# Patient Record
Sex: MALE | Race: WHITE | NOT HISPANIC OR LATINO
[De-identification: names, ages, dates, MRNs, and addresses within clinical notes are randomized per-mention and may not be internally consistent; named-entity substitution may affect disease eponyms.]

---

## 2017-06-15 ENCOUNTER — APPOINTMENT (OUTPATIENT)
Dept: OPHTHALMOLOGY | Facility: CLINIC | Age: 82
End: 2017-06-15

## 2017-12-13 ENCOUNTER — APPOINTMENT (OUTPATIENT)
Dept: OPHTHALMOLOGY | Facility: CLINIC | Age: 82
End: 2017-12-13
Payer: MEDICARE

## 2017-12-13 PROCEDURE — 92014 COMPRE OPH EXAM EST PT 1/>: CPT

## 2018-01-17 ENCOUNTER — APPOINTMENT (OUTPATIENT)
Dept: OPHTHALMOLOGY | Facility: CLINIC | Age: 83
End: 2018-01-17
Payer: MEDICARE

## 2018-01-17 PROCEDURE — 66821 AFTER CATARACT LASER SURGERY: CPT | Mod: LT

## 2018-01-31 ENCOUNTER — APPOINTMENT (OUTPATIENT)
Dept: OPHTHALMOLOGY | Facility: CLINIC | Age: 83
End: 2018-01-31
Payer: MEDICARE

## 2018-01-31 DIAGNOSIS — H26.492 OTHER SECONDARY CATARACT, LEFT EYE: ICD-10-CM

## 2018-01-31 PROCEDURE — 99024 POSTOP FOLLOW-UP VISIT: CPT

## 2018-03-28 ENCOUNTER — APPOINTMENT (OUTPATIENT)
Dept: OPHTHALMOLOGY | Facility: CLINIC | Age: 83
End: 2018-03-28
Payer: MEDICARE

## 2018-03-28 PROCEDURE — 92012 INTRM OPH EXAM EST PATIENT: CPT | Mod: 24

## 2018-09-26 ENCOUNTER — APPOINTMENT (OUTPATIENT)
Dept: OPHTHALMOLOGY | Facility: CLINIC | Age: 83
End: 2018-09-26
Payer: MEDICARE

## 2018-09-26 PROCEDURE — 92012 INTRM OPH EXAM EST PATIENT: CPT

## 2019-03-27 ENCOUNTER — APPOINTMENT (OUTPATIENT)
Dept: OPHTHALMOLOGY | Facility: CLINIC | Age: 84
End: 2019-03-27
Payer: MEDICARE

## 2019-03-27 DIAGNOSIS — H02.88A MEIBOMIAN GLAND DYSFUNCTION RIGHT EYE, UPPER AND LOWER EYELIDS: ICD-10-CM

## 2019-03-27 DIAGNOSIS — Z96.1 PRESENCE OF INTRAOCULAR LENS: ICD-10-CM

## 2019-03-27 DIAGNOSIS — H35.30 UNSPECIFIED MACULAR DEGENERATION: ICD-10-CM

## 2019-03-27 DIAGNOSIS — H02.88B MEIBOMIAN GLAND DYSFUNCTION RIGHT EYE, UPPER AND LOWER EYELIDS: ICD-10-CM

## 2019-03-27 DIAGNOSIS — H43.813 VITREOUS DEGENERATION, BILATERAL: ICD-10-CM

## 2019-03-27 PROCEDURE — 92012 INTRM OPH EXAM EST PATIENT: CPT

## 2019-03-27 RX ORDER — PREDNISOLONE ACETATE 10 MG/ML
1 SUSPENSION/ DROPS OPHTHALMIC
Qty: 1 | Refills: 0 | Status: DISCONTINUED | COMMUNITY
Start: 2018-01-17 | End: 2019-03-27

## 2019-09-25 ENCOUNTER — APPOINTMENT (OUTPATIENT)
Dept: OPHTHALMOLOGY | Facility: CLINIC | Age: 84
End: 2019-09-25
Payer: MEDICARE

## 2019-09-25 ENCOUNTER — NON-APPOINTMENT (OUTPATIENT)
Age: 84
End: 2019-09-25

## 2019-09-25 PROCEDURE — 92014 COMPRE OPH EXAM EST PT 1/>: CPT

## 2019-09-25 PROCEDURE — 92134 CPTRZ OPH DX IMG PST SGM RTA: CPT

## 2020-01-23 ENCOUNTER — APPOINTMENT (OUTPATIENT)
Dept: OPHTHALMOLOGY | Facility: CLINIC | Age: 85
End: 2020-01-23
Payer: MEDICARE

## 2020-01-23 ENCOUNTER — NON-APPOINTMENT (OUTPATIENT)
Age: 85
End: 2020-01-23

## 2020-01-23 PROCEDURE — 92014 COMPRE OPH EXAM EST PT 1/>: CPT

## 2020-01-23 PROCEDURE — 92134 CPTRZ OPH DX IMG PST SGM RTA: CPT

## 2020-02-10 ENCOUNTER — EMERGENCY (EMERGENCY)
Facility: HOSPITAL | Age: 85
LOS: 1 days | Discharge: HOME | End: 2020-02-10
Attending: EMERGENCY MEDICINE | Admitting: EMERGENCY MEDICINE
Payer: MEDICARE

## 2020-02-10 VITALS
SYSTOLIC BLOOD PRESSURE: 114 MMHG | DIASTOLIC BLOOD PRESSURE: 70 MMHG | HEIGHT: 69 IN | OXYGEN SATURATION: 98 % | TEMPERATURE: 98 F | WEIGHT: 125 LBS | RESPIRATION RATE: 16 BRPM | HEART RATE: 67 BPM

## 2020-02-10 VITALS
RESPIRATION RATE: 18 BRPM | HEART RATE: 71 BPM | DIASTOLIC BLOOD PRESSURE: 73 MMHG | OXYGEN SATURATION: 99 % | SYSTOLIC BLOOD PRESSURE: 117 MMHG

## 2020-02-10 PROCEDURE — 73030 X-RAY EXAM OF SHOULDER: CPT | Mod: 26,LT

## 2020-02-10 PROCEDURE — 99284 EMERGENCY DEPT VISIT MOD MDM: CPT | Mod: 25

## 2020-02-10 PROCEDURE — 70450 CT HEAD/BRAIN W/O DYE: CPT | Mod: 26

## 2020-02-10 PROCEDURE — 72125 CT NECK SPINE W/O DYE: CPT | Mod: 26

## 2020-02-10 NOTE — ED PROVIDER NOTE - CONSTITUTIONAL, MLM
normal... Well appearing, awake, alert, oriented to person, place, time/situation and in no apparent distress. Appears his stated age.  awake, alert, oriented to person, place, time/situation and in no apparent distress.

## 2020-02-10 NOTE — ED PROVIDER NOTE - MUSCULOSKELETAL, MLM
Spine appears normal, range of motion is not limited, no muscle or joint tenderness Spine appears normal, range of motion is not limited, no muscle or joint tenderness.  No midline C spine tenderness.  L shoulder - no pain with passive or active ROM.  No reproducible bony tenderness.  Normal radial pulses and distal neurovascular exam.

## 2020-02-10 NOTE — ED ADULT TRIAGE NOTE - CHIEF COMPLAINT QUOTE
BIBA s/p mechanical fall. as per EMS, wife fell first and he fell on top of her. c/o left shoulder and elbow pain. h/o CVA 3 yrs ago, on Eliquis. BIBA s/p mechanical fall. as per EMS, wife fell first and he fell on top of her. c/o left shoulder and elbow pain. h/o CVA 3 yrs ago, on Eliquis. denies LOC or head injury.

## 2020-02-10 NOTE — ED PROVIDER NOTE - CLINICAL SUMMARY MEDICAL DECISION MAKING FREE TEXT BOX
89 y/o male with mechanical fall onto wife who is also a Patient in the ED. Denies hitting his head or extremities but c/o right-sided neck pain and left shoulder pain. On Eliquis for stroke prevention. Denies headache, dizziness, N/V. No reproducible midline cervical spine tenderness. No proximal or anterior left shoulder tenderness and normal ROM. Imaging of brain and cervical spine unremarkable. X-ray of left shoulder without evidence of acute injury. Conservative management discussed with the patient in detail.  Close PMD follow up encouraged.  Strict ED return instructions discussed in detail and patient given the opportunity to ask any questions about their discharge diagnosis and instructions. 89 y/o male with mechanical fall onto wife who is also a Patient in the ED. Denies hitting his head or extremities but c/o right-sided neck pain and left shoulder pain. On Eliquis for stroke prevention. Denies headache, dizziness, N/V. No reproducible midline cervical spine tenderness. No posterior or anterior left shoulder tenderness and normal ROM. Imaging of brain and cervical spine unremarkable. X-ray of left shoulder without evidence of acute injury. Conservative management discussed with the patient in detail.  Close PMD follow up encouraged.  Strict ED return instructions discussed in detail and patient given the opportunity to ask any questions about their discharge diagnosis and instructions.

## 2020-02-10 NOTE — ED PROVIDER NOTE - DIAGNOSTIC INTERPRETATION
ER Physician: Steven Conner  INTERPRETATION:  no acute fracture; no soft tissue swelling noted; normal bony alignment.

## 2020-02-10 NOTE — ED PROVIDER NOTE - PATIENT PORTAL LINK FT
You can access the FollowMyHealth Patient Portal offered by [facility] by registering at the following website: [email or web address]. By joining [employer]’s FollowMyHealth portal, you will also be able to view your health information using other applications (apps) compatible with our system.

## 2020-02-10 NOTE — ED PROVIDER NOTE - OBJECTIVE STATEMENT
89 y/o male with PMHx of CVA (3 years ago, on anticoagulant (Eliquis)), presents to the ED with complaints of left shoulder pain and right-sided neck pain, worse with head movement, s/p mechanical trip and fall today. Patient states he was walking with his wife on their way to lunch and reached an incline, which is poorly designed, when his wife's walker gave out, causing his wife to fall onto her walker. Patient subsequently fell onto his wife as well. Denies head injury or LOC. Able to range left arm without difficulty. Denies abrasions, paresthesia, numbness, tingling, redness, bleeding, headache, dizziness, SOB, chest pain, palpitations, abdominal pain, N/V, focal weakness, hip/back pain.

## 2020-02-10 NOTE — ED ADULT NURSE NOTE - CHIEF COMPLAINT QUOTE
BIBA s/p mechanical fall. as per EMS, wife fell first and he fell on top of her. c/o left shoulder and elbow pain. h/o CVA 3 yrs ago, on Eliquis. denies LOC or head injury.

## 2020-02-10 NOTE — ED PROVIDER NOTE - CHPI ED SYMPTOMS NEG
no numbness, no tingling, no weakness, no paresthesia, no redness, no bleeding, no headache, no dizziness, no chest pain, no SOB, no palpitations, no abdominal pain, no nausea, no vomiting, no hip/back pain/no abrasion
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
## 2020-02-15 DIAGNOSIS — M54.2 CERVICALGIA: ICD-10-CM

## 2020-02-15 DIAGNOSIS — M25.512 PAIN IN LEFT SHOULDER: ICD-10-CM
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
## 2020-07-14 ENCOUNTER — NON-APPOINTMENT (OUTPATIENT)
Age: 85
End: 2020-07-14

## 2020-07-14 ENCOUNTER — APPOINTMENT (OUTPATIENT)
Dept: OPHTHALMOLOGY | Facility: CLINIC | Age: 85
End: 2020-07-14
Payer: MEDICARE

## 2020-07-14 PROCEDURE — 92012 INTRM OPH EXAM EST PATIENT: CPT

## 2020-07-14 PROCEDURE — 92134 CPTRZ OPH DX IMG PST SGM RTA: CPT

## 2020-07-16 PROBLEM — I63.9 CEREBRAL INFARCTION, UNSPECIFIED: Chronic | Status: ACTIVE | Noted: 2020-02-10

## 2021-01-14 ENCOUNTER — APPOINTMENT (OUTPATIENT)
Dept: OPHTHALMOLOGY | Facility: CLINIC | Age: 86
End: 2021-01-14
Payer: MEDICARE

## 2021-01-14 ENCOUNTER — NON-APPOINTMENT (OUTPATIENT)
Age: 86
End: 2021-01-14

## 2021-01-14 PROCEDURE — 92134 CPTRZ OPH DX IMG PST SGM RTA: CPT

## 2021-01-14 PROCEDURE — 99072 ADDL SUPL MATRL&STAF TM PHE: CPT

## 2021-01-14 PROCEDURE — 92014 COMPRE OPH EXAM EST PT 1/>: CPT

## 2021-05-06 ENCOUNTER — NON-APPOINTMENT (OUTPATIENT)
Age: 86
End: 2021-05-06

## 2021-05-06 ENCOUNTER — APPOINTMENT (OUTPATIENT)
Dept: OPHTHALMOLOGY | Facility: CLINIC | Age: 86
End: 2021-05-06
Payer: MEDICARE

## 2021-05-06 PROCEDURE — 99072 ADDL SUPL MATRL&STAF TM PHE: CPT

## 2021-05-06 PROCEDURE — 92014 COMPRE OPH EXAM EST PT 1/>: CPT

## 2021-05-06 PROCEDURE — 92134 CPTRZ OPH DX IMG PST SGM RTA: CPT

## 2021-07-23 ENCOUNTER — APPOINTMENT (OUTPATIENT)
Dept: OPHTHALMOLOGY | Facility: CLINIC | Age: 86
End: 2021-07-23
Payer: MEDICARE

## 2021-07-23 ENCOUNTER — NON-APPOINTMENT (OUTPATIENT)
Age: 86
End: 2021-07-23

## 2021-07-23 PROCEDURE — 92134 CPTRZ OPH DX IMG PST SGM RTA: CPT

## 2021-07-23 PROCEDURE — 99072 ADDL SUPL MATRL&STAF TM PHE: CPT

## 2021-07-23 PROCEDURE — 92012 INTRM OPH EXAM EST PATIENT: CPT

## 2021-08-10 ENCOUNTER — APPOINTMENT (OUTPATIENT)
Dept: OPHTHALMOLOGY | Facility: CLINIC | Age: 86
End: 2021-08-10
Payer: MEDICARE

## 2021-08-10 ENCOUNTER — NON-APPOINTMENT (OUTPATIENT)
Age: 86
End: 2021-08-10

## 2021-08-10 PROCEDURE — 92014 COMPRE OPH EXAM EST PT 1/>: CPT

## 2021-08-10 PROCEDURE — 92134 CPTRZ OPH DX IMG PST SGM RTA: CPT

## 2021-11-09 ENCOUNTER — NON-APPOINTMENT (OUTPATIENT)
Age: 86
End: 2021-11-09

## 2021-11-09 ENCOUNTER — APPOINTMENT (OUTPATIENT)
Dept: OPHTHALMOLOGY | Facility: CLINIC | Age: 86
End: 2021-11-09
Payer: MEDICARE

## 2021-11-09 PROCEDURE — 92014 COMPRE OPH EXAM EST PT 1/>: CPT

## 2021-12-07 ENCOUNTER — APPOINTMENT (OUTPATIENT)
Dept: OPHTHALMOLOGY | Facility: CLINIC | Age: 86
End: 2021-12-07
Payer: MEDICARE

## 2021-12-07 ENCOUNTER — NON-APPOINTMENT (OUTPATIENT)
Age: 86
End: 2021-12-07

## 2021-12-07 PROCEDURE — 92012 INTRM OPH EXAM EST PATIENT: CPT

## 2021-12-07 PROCEDURE — 92134 CPTRZ OPH DX IMG PST SGM RTA: CPT

## 2022-01-01 ENCOUNTER — NON-APPOINTMENT (OUTPATIENT)
Age: 87
End: 2022-01-01

## 2022-01-01 ENCOUNTER — APPOINTMENT (OUTPATIENT)
Dept: OPHTHALMOLOGY | Facility: CLINIC | Age: 87
End: 2022-01-01

## 2022-01-01 PROCEDURE — 92012 INTRM OPH EXAM EST PATIENT: CPT

## 2022-01-01 PROCEDURE — 99212 OFFICE O/P EST SF 10 MIN: CPT

## 2022-01-01 PROCEDURE — 92134 CPTRZ OPH DX IMG PST SGM RTA: CPT

## 2022-04-12 ENCOUNTER — NON-APPOINTMENT (OUTPATIENT)
Age: 87
End: 2022-04-12

## 2022-04-12 ENCOUNTER — APPOINTMENT (OUTPATIENT)
Dept: OPHTHALMOLOGY | Facility: CLINIC | Age: 87
End: 2022-04-12
Payer: MEDICARE

## 2022-04-12 PROCEDURE — 92134 CPTRZ OPH DX IMG PST SGM RTA: CPT

## 2022-04-12 PROCEDURE — 92012 INTRM OPH EXAM EST PATIENT: CPT

## 2022-05-17 ENCOUNTER — NON-APPOINTMENT (OUTPATIENT)
Age: 87
End: 2022-05-17

## 2022-05-17 ENCOUNTER — APPOINTMENT (OUTPATIENT)
Dept: OPHTHALMOLOGY | Facility: CLINIC | Age: 87
End: 2022-05-17
Payer: MEDICARE

## 2022-05-17 PROCEDURE — 92014 COMPRE OPH EXAM EST PT 1/>: CPT

## 2023-01-01 ENCOUNTER — NON-APPOINTMENT (OUTPATIENT)
Age: 88
End: 2023-01-01

## 2023-01-01 ENCOUNTER — APPOINTMENT (OUTPATIENT)
Dept: OPHTHALMOLOGY | Facility: CLINIC | Age: 88
End: 2023-01-01

## 2023-01-01 ENCOUNTER — APPOINTMENT (OUTPATIENT)
Dept: OPHTHALMOLOGY | Facility: CLINIC | Age: 88
End: 2023-01-01
Payer: MEDICARE

## 2023-01-01 PROCEDURE — 92134 CPTRZ OPH DX IMG PST SGM RTA: CPT

## 2023-01-01 PROCEDURE — 92014 COMPRE OPH EXAM EST PT 1/>: CPT

## 2023-11-07 ENCOUNTER — APPOINTMENT (OUTPATIENT)
Dept: OPHTHALMOLOGY | Facility: CLINIC | Age: 88
End: 2023-11-07
